# Patient Record
(demographics unavailable — no encounter records)

---

## 2025-05-21 NOTE — COUNSELING
[Nutrition/ Exercise/ Weight Management] : nutrition, exercise, weight management [Body Image] : body image [Vitamins/Supplements] : vitamins/supplements [Breast Self Exam] : breast self exam [Bladder Hygiene] : bladder hygiene [Contraception/ Emergency Contraception/ Safe Sexual Practices] : contraception, emergency contraception, safe sexual practices [STD (testing, results, tx)] : STD (testing, results, tx) [Medication Management] : medication management

## 2025-05-21 NOTE — HISTORY OF PRESENT ILLNESS
[FreeTextEntry1] : Patient presents for her annual exam.   Reports that she is sexually active and is not taking her ocps every day and is not using condoms.  Patient and I discussed risks of unprotected sex.  Denies irreg bleeding, abdominal or pelvic pain, change in discharge, change in bowel or bladder habits or any other concerns.  PHQ 9 positive - patient with history and has follow up.

## 2025-05-21 NOTE — PLAN
[FreeTextEntry1] : Swab obtained Self breast exams, safe sex,diet and exercise discussed patient requesting switching to twirla for better compliance.  RTO prn and annually for exams

## 2025-05-28 NOTE — PLAN
[FreeTextEntry1] : treatments sent to pharmacy Discussed partner treatment [facility] RTO in a month for [place]

## 2025-05-28 NOTE — HISTORY OF PRESENT ILLNESS
[FreeTextEntry1] : Patient presents for results discussion.  +chlamydia and yeast.  All results discussed in detail.  All questions answered.